# Patient Record
(demographics unavailable — no encounter records)

---

## 2022-07-30 LAB
APPEARANCE UR: ABNORMAL
BACTERIA, URINE: ABNORMAL
BILIRUB UR STRIP.AUTO-MCNC: ABNORMAL MG/DL
COLOR UR: YELLOW
GLUCOSE UR STRIP.AUTO-MCNC: NEGATIVE MG/DL
KETONES UR STRIP.AUTO-MCNC: >=80 MG/DL
LEUKOCYTE ESTERASE UR QL STRIP: ABNORMAL
MUCUS, URINE: ABNORMAL /LPF
NITRITE UR QL STRIP.AUTO: NEGATIVE
PH UR STRIP.AUTO: 6 [PH] (ref 4.5–8)
PROT UR QL STRIP: 100
RBC # UR STRIP: ABNORMAL /UL
RBC URINE: ABNORMAL /HPF (ref 0–2)
SP GR UR STRIP: >=1.03 (ref 1–1.03)
SQUAMOUS EPITHELIAL: ABNORMAL /LPF
UROBILINOGEN UR STRIP-MCNC: 1 EU/DL
WBC URINE: ABNORMAL /HPF (ref 0–2)

## 2022-08-01 LAB
FINAL REPORT: NORMAL
ORGANISM: NORMAL
PRELIMINARY: NORMAL
PRELIMINARY: NORMAL

## 2022-10-17 NOTE — DISCHARGE SUMMARY
ED Clinical Summary                         [facility]  [address], [place], [address] [phone]           PERSON INFORMATION  Name: Ranjana Shankar Age:   7 Years : 2014   Sex: Female Language: English PCP: PCP,  UNKNOWN   Marital Status:  Single Phone: [phone] Med Service: MED-Medicine   MRN:  [id number] Acct# [de-identified] Arrival: 2022 13:10:00   Visit Reason: Medical screening exam; BLOOD IN URINE Acuity: 4 LOS: 000 02:49   Address:      [address] [place] [id number]  Diagnosis:      Urinary tract infection  Printed Prescriptions: Allergies      No Known Medication Allergies      Medications Administered During Visit:                  Medication Dose Route   sulfamethoxazole-trimethoprim 160 mg Oral       Patient Medication List:              sulfamethoxazole-trimethoprim (Bactrim Pediatric 200 mg-40 mg/5 mL oral suspension) 20 Milliliter Oral (given by mouth) 2 times a day for 7 Days. for max dose 160 mg TMP per dose. Refills: 0. Major Tests and Procedures: The following procedures and tests were performed during your ED visit. COMMONPROCEDURES%>  COMMON PROCEDURESCOMMENTS%>          Laboratory Orders  Name Status Details   . UA Micro Completed Urine, Clean Catch, Stat, ST - Stat, Collected, 22 14:42:00 EDT, Once, 22 14:42:00 EDT, Nurse collect, 22 14:42:00 EDT, GERRY Jalloh, Print label Y/N, [id number]   C Urine Ordered Urine, Clean Catch, Stat, ST - Stat, 22 15:41:00 EDT, 22 15:41:00 EDT, Nurse collect   UA Rflx [place] Completed Urine, Clean Catch, Stat, ST - Stat, 22 14:21:00 EDT, Once, 22 14:21:00 EDT, Nurse collect, Ana HERNANDEZ, Print label Y/N               Radiology Orders  No radiology orders were placed.               Patient Care Orders  Name Status Details   Discharge Patient Ordered 22 15:47:00 EDT   ED Assessment Pediatric Completed 07/30/22 13:16:35 EDT, 07/30/22 13:16:35 EDT   ED Secondary Triage Completed 07/30/22 13:16:35 EDT, 07/30/22 13:16:35 EDT   ED Triage Pediatric Completed 07/30/22 13:11:02 EDT, 07/30/22 13:11:02 EDT             PROVIDER INFORMATION               Provider Role Assigned Paula Caldwell ED Nurse 7/30/2022 14:19:59    Ellie HERNANDEZ ED Provider 7/30/2022 14:20:50 7/30/2022 14:22:49   Ferny WOODS ED MidLevel 7/30/2022 14:20:53 7/30/2022 14:20:55   Laurie OCONNELL ED Provider 7/30/2022 14:20:58        Attending Physician:  Laurie OCONNELL     Admit Doc  Laurie OCONNELL     Consulting Doc       VITALS INFORMATION  Vital Sign Triage Latest   Temp Oral ORAL_1%>36.8 degC ORAL%>36.8 degC   Temp Temporal TEMPORAL_1%> TEMPORAL%>   Temp Intravascular INTRAVASCULAR_1%> INTRAVASCULAR%>   Temp Axillary AXILLARY_1%> AXILLARY%>   Temp Rectal RECTAL_1%> RECTAL%>   02 Sat 99 % 98 %   Respiratory Rate RATE_1%>18 br/min RATE%>22 br/min   Peripheral Pulse Rate PULSE RATE_1%> PULSE RATE%>   Apical Heart Rate HEART RATE_1%> HEART RATE%>   Blood Pressure BLOOD PRESSURE_1%>/ BLOOD PRESSURE_1%>78 mmHg BLOOD PRESSURE%>147 mmHg / BLOOD PRESSURE%>95 mmHg                 Immunizations      No Immunizations Documented This Visit          DISCHARGE INFORMATION   Discharge Disposition: H Outpt-Sent Home   Discharge Location:    Home   Discharge Date and Time:    7/30/2022 15:59:20   ED Checkout Date and Time:    7/30/2022 15:59:20     DEPART REASON INCOMPLETE INFORMATION               Depart Action Incomplete Reason   Interactive View/I&O Recently assessed               Problems      Active           No Chronic Problems              Smoking Status      No Smoking Status Documented         PATIENT EDUCATION INFORMATION  Instructions:       Urinary Tract Infection, Pediatric     Follow up:                    With: Address: When:   Follow up with primary care provider  Within 1 to 2 days   Comments: For reevaluation if symptoms not improving; return to ED if any new concerns or changes in symptoms     A urine culture was sent. Please follow-up with your primary doctor to reevaluate final urine culture results. Return to the ER for any worsening symptoms or other concerns           ED PROVIDER DOCUMENTATION     Patient:   Nallely Almonte            MRN: [id number]            FIN: [id number]               Age:   9 years     Sex:  Female     :  2014   Associated Diagnoses:   Urinary tract infection   Author:   Nick OCONNELL      Basic Information   Time seen: Provider Seen (ST)   ED Provider/Time:    Nick OCONNELL / 2022 14:20  . Additional information: Chief Complaint from Nursing Triage Note   Chief Complaint  Chief Complaint: pt ambulates to triage w/mom reports having spotting when she wipes. just started last night. did not see blood in urine or stool. (22 13:12:00). History of Present Illness   Patient presents with mother for evaluation of spotting on the toilet paper the patient wipes. This happened starting last night. She has not any sravanthi blood in the urine or stool. No vaginal bleeding. She has not had any trauma reported. No abdominal pain, nausea, or vomiting. No systemic symptoms. Child is otherwise healthy with immunizations up-to-date. No history of any prior  issues. Severity is mild. Patient herself denies any dysuria or other concerns. .        Review of Systems             Additional review of systems information: All other systems reviewed and otherwise negative. Health Status   Allergies: Allergic Reactions (All)  No Known Medication Allergies. Immunizations: Up to date. Past Medical/ Family/ Social History   Medical history: Reviewed as documented in chart. Surgical history: Reviewed as documented in chart. Family history: Not significant. Social history: Reviewed as documented in chart.    Problem list:    Active Problems (1)  No Chronic Problems   . Physical Examination               Vital Signs   Vital Signs   7/30/2022 14:42 EDT Respiratory Rate 20 br/min (Modified)   5/79/0740 93:67 EDT Systolic Blood Pressure 820 mmHg     Diastolic Blood Pressure 78 mmHg     Temperature Oral 36.8 degC     Heart Rate Monitored 114 bpm     Respiratory Rate 18 br/min     SpO2 99 %    . Measurements   7/30/2022 15:37 EDT Weight Dosing 38.7 kg   7/30/2022 13:12 EDT Weight Measured 38.7 kg   . Basic Oxygen Information   7/30/2022 14:42 EDT Oxygen Therapy Room air   7/30/2022 13:12 EDT Oxygen Therapy Room air    SpO2 99 %   . General:  Alert, no acute distress. Skin:  Warm, dry. Head:  Normocephalic, atraumatic. Eye:  Normal conjunctiva. Respiratory:  Respirations are non-labored. Gastrointestinal:  Soft, Nontender, Non distended. Genitourinary:  External vaginal exam chaperoned by nurse Chery Barajas as well as mother was present. The external vaginal area is normal.  The introitus is normal with intact hymen. There is some mild hyperemia to the vaginal introitus but no obvious trauma no bleeding. Psychiatric:  Cooperative. Medical Decision Making   Documents reviewed:  Emergency department nurses' notes, flowsheet, vital signs. Results review:  Lab results : Lab View   7/30/2022 14:42 EDT UA Color Yellow    UA Appear Cloudy    UA Glucose Negative    UA Bili Small    UA Ketones >=80 mg/dL    UA Spec Grav >=1.030    UA Blood Large    UA pH 6.0    Protein U 100    UA Urobilinogen 1.0 EU/dL    UA Nitrite Negative    UA Leuk Est Large    WBC U  /HPF    RBC U 21-50 /HPF    Sq Epi U Few /LPF    Bacteria U Moderate    Mucus U Few /LPF   .       Reexamination/ Reevaluation   Vital signs   Basic Oxygen Information   7/30/2022 14:42 EDT Oxygen Therapy Room air   7/30/2022 13:12 EDT Oxygen Therapy Room air    SpO2 99 %         Impression and Plan   Diagnosis   Urinary tract infection ([id number] N39.0, Discharge, Medical)   Plan   Condition: Stable. Disposition: Discharged: to home. Prescriptions: Bactrim pediatric 200-40, 20 mL per dose x7 days. Due to the patient's weight this is to the maximum recommended dose of 160 mg of trimethoprim per dose. Patient was given the following educational materials: Urinary Tract Infection, Pediatric. Follow up with: Follow up with primary care provider Within 1 to 2 days For reevaluation if symptoms not improving; return to ED if any new concerns or changes in symptoms    A urine culture was sent. Please follow-up with your primary doctor to reevaluate final urine culture results. Return to the ER for any worsening symptoms or other concerns. Notes: No signs of vaginal trauma per external inspection. The urine looks infected and we will treat for this as the cause of the hematuria. Patient itself is relatively asymptomatic. This sounds like her first UTI in life I did advise mother close pediatrician follow-up is important as well as pending urine culture.

## 2022-10-17 NOTE — ED NOTES
ED Triage Note       ED Secondary Triage Entered On:  7/30/2022 14:42 EDT    Performed On:  7/30/2022 14:41 EDT by Algernon Call C-RN               General Information   Barriers to Learning :   None evident   Immunizations Current :   Yes   ED Home Meds Section :   Document assessment   [facility] ED Fall Risk Section :   Not applicable   ED Advance Directives Section :   Document assessment   ED Palliative Screen :   N/A (prefilled for <66yo)   Algernon Call C-RN - 7/30/2022 14:41 EDT   (As Of: 7/30/2022 14:42:24 EDT)   Problems(Active)    No Chronic Problems (Cerner  :NKP )  Name of Problem:   No Chronic Problems ; Recorder:   Algernon Call C-RN; Code:   NKP ; Last Updated:   7/30/2022 14:42 EDT ; Life Cycle Date:   7/30/2022 ; Life Cycle Status:   Active ; Vocabulary:   Cerner          Diagnoses(Active)    Medical screening exam  Date:   7/30/2022 ; Diagnosis Type:   Reason For Visit ; Confirmation:   Complaint of ; Clinical Dx:    Medical screening exam ; Classification:   Medical ; Clinical Service:   Emergency medicine ; Code:   PNED ; Probability:   0 ; Diagnosis Code:   [id number]             -    Procedure History   (As Of: 7/30/2022 14:42:24 EDT)     ED Advance Directive   Advance Directive :   No   Algernon Call C-RN - 7/30/2022 14:41 EDT   Med Hx   Medication List   (As Of: 7/30/2022 14:42:24 EDT)   No Known Home Medications     Algernon Call C-RN - 7/30/2022 14:42:20

## 2022-10-17 NOTE — ED NOTES
ED Triage Note       ED Triage Pediatric Entered On:  7/30/2022 13:16 EDT    Performed On:  7/30/2022 13:12 EDT by Lucille Kraus               Triage Pediatric 8.6.19   Chief Complaint :   pt ambulates to triage w/mom reports having spotting when she wipes. just started last night. did not see blood in urine or stool. [place] [facility] Mode of Arrival :   Private vehicle   Infectious Disease Documentation :   Document assessment   Minor Treatment Consent :   Obtained   Pregnancy Status :   N/A   Temperature Oral :   36.8 degC(Converted to: 98.2 degF)    Heart Rate Monitored :   114 bpm   Respiratory Rate :   18 br/min   Systolic Blood Pressure :   927 mmHg   Diastolic Blood Pressure :   78 mmHg   SpO2 :   99 %   Oxygen Therapy :   Room air   Numeric Rating Pain Scale :   0 = No pain   ED Peds Weight :   Document assessment   Lucille Kraus - 7/30/2022 13:12 EDT   DCP GENERIC CODE   Tracking Acuity :   4   Tracking Group :   ED [facility]   Lucille Kraus - 7/30/2022 13:12 EDT   ED General Section :   Document assessment   ED Allergies Section :   Document assessment   ED Reason for Visit Section :   Document assessment   ED Quick Assessment :   Patient appears awake, alert, oriented to baseline. Skin warm and dry. Moves all extremities. Respiration even and unlabored. Appears in no apparent distress. Lucille Kraus - 7/30/2022 13:12 EDT   ID Risk Screen Symptoms   Recent Travel History :   No recent travel   Last 90 days COVID-19 ID :   No   Close Contact with COVID-19 ID :   No   Last 14 days COVID-19 ID :   No   Jorge Alberto Schultz-RN - 7/30/2022 13:12 EDT   Allergies   (As Of: 7/30/2022 13:16:35 EDT)   Allergies (Active)   No Known Medication Allergies  Estimated Onset Date:   Unspecified ; Created Heraclio Gusman; Reaction Status:   Active ; Category:   Drug ; Substance:   No Known Medication Allergies ; Type:    Allergy ; Updated By:   Lucille Kraus; Source:   Family ; Reviewed Date:   7/30/2022 13:15 EDT        Consent to Treat a Minor   Minor Treatment Consent Given By :   Mother   Method of Consent for Minor Treatment : In person   Naheed Griffith - 7/30/2022 13:12 EDT   Psycho-Social   Last 3 mo, thoughts killing self/others :   NA - Pediatric pt OR unable to answer   Right click within box for Suspected Abuse policy link. :   None   Feels Safe Where Live :   Yes   ED Behavioral Activity Rating Scale :   4 - Quiet and awake (normal level of activity)   Naheed Griffith - 7/30/2022 13:12 EDT   ED Reason for Visit   (As Of: 7/30/2022 13:16:35 EDT)   Diagnoses(Active)    Medical screening exam  Date:   7/30/2022 ; Diagnosis Type:   Reason For Visit ; Confirmation:   Complaint of ; Clinical Dx:    Medical screening exam ; Classification:   Medical ; Clinical Service:   Emergency medicine ; Code:   PNED ; Probability:   0 ; Diagnosis Code:   [id number]        Peds Weight 8.6.19   Dosing Weight Obtained By :   Measured   ED Out of Range Weight :   Document assessment   Jeny Schultz - 7/30/2022 13:12 EDT   Out of Range Weight 8.6.19   Weight Measured :   38.7 kg(Converted to: 85 lb 5 oz)    Jeny Schultz - 7/30/2022 13:12 EDT

## 2022-10-17 NOTE — ED NOTES
that smells bad or unusual.      Symptoms in older children     Needing to urinate right away (urgently). Pain or burning with urination. Bed-wetting, or getting up at night to urinate. Trouble urinating. Blood in the urine. Fever. Pain in the lower abdomen or back. Vaginal discharge for girls. Constipation. How is this diagnosed? This condition is diagnosed based on your child's medical history and physical exam. Your child may also have other tests, including:   Urine tests. Depending on your child's age and whether he or she is toilet trained, urine may be collected by:  ? Clean catch urine collection. ? Urinary catheterization. Blood tests. Tests for sexually transmitted infections (STIs). This may be done for older children. If your child has had more than one UTI, a cystoscopy or imaging studies may be done to determine the cause of the infections. How is this treated? Treatment for this condition often includes a combination of two or more of the following:   Antibiotic medicine. Other medicines to treat less common causes of UTI. Over-the-counter medicines to treat pain. Drinking enough water to help clear bacteria out of the urinary tract and keep your child well hydrated. If your child cannot do this, fluids may need to be given through an IV. Bowel and bladder training. In rare cases, urinary tract infections can cause sepsis. Sepsis is a life-threatening condition that occurs when the body responds to an infection. Sepsis is treated in the hospital with IV antibiotics, fluids, and other medicines. Follow these instructions at home:       After urinating or having a bowel movement, your child should wipe from front to back. Your child should use each tissue only one time. Medicines     Give over-the-counter and prescription medicines only as told by your child's health care provider.      If your child was prescribed an antibiotic medicine, give it as told by your child's health care provider. Do not stop giving the antibiotic even if your child starts to feel better. General instructions     Encourage your child to:  ? Empty his or her bladder often and to not hold urine for long periods of time. ? Empty his or her bladder completely during urination. ? Sit on the toilet for 10 minutes after each meal to help him or her build the habit of going to the bathroom more regularly. Have your child drink enough fluid to keep his or her urine pale yellow. Keep all follow-up visits as told by your child's health care provider. This is important. Contact a health care provider if your child's symptoms:     Have not improved after you have given antibiotics for 2 days. Go away and then return. Get help right away if your child:     Has a fever. Is younger than 3 months and has a temperature of 100. 4? F (38?C) or higher. Has severe pain in the back or lower abdomen. Is vomiting. Summary     A urinary tract infection (UTI) is an infection of any part of the urinary tract, which includes the kidneys, ureters, bladder, and urethra. Most urinary tract infections are caused by bacteria in your child's genital area, around the entrance to the urinary tract (urethra). Treatment for this condition often includes antibiotic medicines. If your child was prescribed an antibiotic medicine, give it as told by your child's health care provider. Do not stop giving the antibiotic even if your child starts to feel better. Keep all follow-up visits as told by your child's health care provider. This information is not intended to replace advice given to you by your health care provider. Make sure you discuss any questions you have with your health care provider. Document Revised: 06/27/2019 Document Reviewed: 06/27/2019  Elsevier Patient Education ?  [address]

## 2022-10-17 NOTE — ED NOTES
ED Patient Summary       ;          [facility]  [address], [place], [address] [place] [address]  [phone]  Discharge Instructions (Patient)  Sabrina Yoon  :  2014                   MRN: [id number]                   FIN: [id number]  Reason For Visit: Medical screening exam; BLOOD IN URINE  Final Diagnosis: Urinary tract infection     Visit Date: 2022 13:10:00  Address: [place] [address]  Phone: [phone]     Emergency Department Providers:         Primary Physician:      Zenaida Martin      [facility] would like to thank you for allowing us to assist you with your healthcare needs. The following includes patient education materials and information regarding your injury/illness. Follow-up Instructions: You were seen today on an emergency basis. Please contact your primary care doctor for a follow up appointment. If you received a referral to a specialist doctor, it is important you follow-up as instructed. It is important that you call your follow-up doctor to schedule and confirm the location of your next appointment. Your doctor may practice at multiple locations. The office location of your follow-up appointment may be different to the one written on your discharge instructions. If you do not have a primary care doctor, please call [phone] for help in finding a [facility] Provider. For help in finding a specialist doctor, please call ..26.. If your condition gets worse before your follow-up with your primary care doctor or specialist, please return to the Emergency Department. Coronavirus 2019 (COVID-19) Reminders:     Patients age 15 - 24, with parental consent, and over age 25 can make an appointment for a COVID-19 vaccine. Patients can contact their [facility] doctors' offices to schedule an appointment to receive the COVID-19 vaccine.  Patients who do not have a [facility] physician can call [phone] to schedule vaccination appointments. Follow Up Appointments:  Primary Care Provider:     Name: PCP,  UNKNOWN     Phone:                  With: Address: When:   Follow up with primary care provider  Within 1 to 2 days   Comments: For reevaluation if symptoms not improving; return to ED if any new concerns or changes in symptoms     A urine culture was sent. Please follow-up with your primary doctor to reevaluate final urine culture results. Return to the ER for any worsening symptoms or other concerns              [address] SERVICES%>             New Medications  Printed Prescriptions  sulfamethoxazole-trimethoprim (Bactrim Pediatric 200 mg-40 mg/5 mL oral suspension) 20 Milliliter Oral (given by mouth) 2 times a day for 7 Days. for max dose 160 mg TMP per dose. Refills: 0. Last Dose:____________________      Allergy Info: No Known Medication Allergies     Discharge Additional Information          Discharge Patient 07/30/22 15:47:00 EDT      Patient Education Materials:        Urinary Tract Infection, Pediatric      A urinary tract infection (UTI) is an infection of any part of the urinary tract. The urinary tract includes the kidneys, ureters, bladder, and urethra. These organs make, store, and get rid of urine in the body. Your child's health care provider may use other names to describe the infection. An upper UTI affects the ureters and kidneys (pyelonephritis). A lower UTI affects the bladder (cystitis) and urethra (urethritis). What are the causes? Most urinary tract infections are caused by bacteria in the genital area, around the entrance to your child's urinary tract (urethra). These bacteria grow and cause inflammation of your child's urinary tract. What increases the risk? This condition is more likely to develop if:   Your child is a boy and is uncircumcised.      Your child is a girl and is 3years old or younger. Your child is a boy and is 3year old or younger. Your child is an infant and has a condition in which urine from the bladder goes back into the tubes that connect the kidneys to the bladder (vesicoureteral reflux). Your child is an infant and he or she was born prematurely. Your child is constipated. Your child has a urinary catheter that stays in place (indwelling). Your child has a weak disease-fighting system (immunesystem). Your child has a medical condition that affects his or her bowels, kidneys, or bladder. Your child has diabetes. Your older child engages in sexual activity. What are the signs or symptoms? Symptoms of this condition vary depending on the age of the child. Symptoms in younger children     Fever. This may be the only symptom in young children. Refusing to eat. Sleeping more often than usual.     Irritability. Vomiting. Diarrhea. Blood in the urine. Urine that smells bad or unusual.      Symptoms in older children     Needing to urinate right away (urgently). Pain or burning with urination. Bed-wetting, or getting up at night to urinate. Trouble urinating. Blood in the urine. Fever. Pain in the lower abdomen or back. Vaginal discharge for girls. Constipation. How is this diagnosed? This condition is diagnosed based on your child's medical history and physical exam. Your child may also have other tests, including:   Urine tests. Depending on your child's age and whether he or she is toilet trained, urine may be collected by:  ? Clean catch urine collection. ? Urinary catheterization. Blood tests. Tests for sexually transmitted infections (STIs). This may be done for older children. If your child has had more than one UTI, a cystoscopy or imaging studies may be done to determine the cause of the infections. How is this treated? higher. Has severe pain in the back or lower abdomen. Is vomiting. Summary     A urinary tract infection (UTI) is an infection of any part of the urinary tract, which includes the kidneys, ureters, bladder, and urethra. Most urinary tract infections are caused by bacteria in your child's genital area, around the entrance to the urinary tract (urethra). Treatment for this condition often includes antibiotic medicines. If your child was prescribed an antibiotic medicine, give it as told by your child's health care provider. Do not stop giving the antibiotic even if your child starts to feel better. Keep all follow-up visits as told by your child's health care provider. This information is not intended to replace advice given to you by your health care provider. Make sure you discuss any questions you have with your health care provider. Document Revised: 06/27/2019 Document Reviewed: 06/27/2019  Ken Patient Education ? [address] [facility]      ---------------------------------------------------------------------------------------------------------------------  [facility] allows patients to review your COVID and other test results as well as discharge documents from any [facility], Emergency Department, surgical center or outpatient lab. Test results are typically available 36 hours after the test is completed. [address] encourages you to self-enroll in the [facility] Patient Portal.     To begin your self-enrollment process, please visit [email or web address] Under [facility], click on Sign up now. NOTE: You must be 16 years and older to use [facility] Self-Enroll online. If you are a parent, caregiver, or guardian; you need an invite to access your childs or dependents health records.  To obtain an invite, contact the Medical Records department at [phone] Monday through Friday, 8-4:30, select option 3 . If we receive your call afterhours, we will return your call the next business day. If you have issues trying to create or access your account, contact [employer] at [phone] available 7 days a week 24 hours a day.      Comment:

## 2022-10-17 NOTE — ED PROVIDER NOTES
General Medical Problem *ED        Patient:   Glenn Rater            MRN: [id number]            FIN: [id number]               Age:   9 years     Sex:  Female     :  2014   Associated Diagnoses:   Urinary tract infection   Author:   Yair OCONNELL      Basic Information   Time seen: Provider Seen (ST)   ED Provider/Time:    Yair OCONNELL / 2022 14:20  . Additional information: Chief Complaint from Nursing Triage Note   Chief Complaint  Chief Complaint: pt ambulates to triage w/mom reports having spotting when she wipes. just started last night. did not see blood in urine or stool. (22 13:12:00). History of Present Illness   Patient presents with mother for evaluation of spotting on the toilet paper the patient wipes. This happened starting last night. She has not any sravanthi blood in the urine or stool. No vaginal bleeding. She has not had any trauma reported. No abdominal pain, nausea, or vomiting. No systemic symptoms. Child is otherwise healthy with immunizations up-to-date. No history of any prior  issues. Severity is mild. Patient herself denies any dysuria or other concerns. .        Review of Systems             Additional review of systems information: All other systems reviewed and otherwise negative. Health Status   Allergies: Allergic Reactions (All)  No Known Medication Allergies. Immunizations: Up to date. Past Medical/ Family/ Social History   Medical history: Reviewed as documented in chart. Surgical history: Reviewed as documented in chart. Family history: Not significant. Social history: Reviewed as documented in chart. Problem list:    Active Problems (1)  No Chronic Problems   .       Physical Examination               Vital Signs   Vital Signs   2022 14:42 EDT Respiratory Rate 20 br/min (Modified)    48:23 EDT Systolic Blood Pressure 179 mmHg     Diastolic Blood Pressure 78 mmHg     Temperature Oral 36.8 degC Heart Rate Monitored 114 bpm     Respiratory Rate 18 br/min     SpO2 99 %    . Measurements   7/30/2022 15:37 EDT Weight Dosing 38.7 kg   7/30/2022 13:12 EDT Weight Measured 38.7 kg   . Basic Oxygen Information   7/30/2022 14:42 EDT Oxygen Therapy Room air   7/30/2022 13:12 EDT Oxygen Therapy Room air    SpO2 99 %   . General:  Alert, no acute distress. Skin:  Warm, dry. Head:  Normocephalic, atraumatic. Eye:  Normal conjunctiva. Respiratory:  Respirations are non-labored. Gastrointestinal:  Soft, Nontender, Non distended. Genitourinary:  External vaginal exam chaperoned by nurse Claudia Handler as well as mother was present. The external vaginal area is normal.  The introitus is normal with intact hymen. There is some mild hyperemia to the vaginal introitus but no obvious trauma no bleeding. Psychiatric:  Cooperative. Medical Decision Making   Documents reviewed:  Emergency department nurses' notes, flowsheet, vital signs. Results review:  Lab results : Lab View   7/30/2022 14:42 EDT UA Color Yellow    UA Appear Cloudy    UA Glucose Negative    UA Bili Small    UA Ketones >=80 mg/dL    UA Spec Grav >=1.030    UA Blood Large    UA pH 6.0    Protein U 100    UA Urobilinogen 1.0 EU/dL    UA Nitrite Negative    UA Leuk Est Large    WBC U  /HPF    RBC U 21-50 /HPF    Sq Epi U Few /LPF    Bacteria U Moderate    Mucus U Few /LPF   . Reexamination/ Reevaluation   Vital signs   Basic Oxygen Information   7/30/2022 14:42 EDT Oxygen Therapy Room air   7/30/2022 13:12 EDT Oxygen Therapy Room air    SpO2 99 %         Impression and Plan   Diagnosis   Urinary tract infection ([id number] N39.0, Discharge, Medical)   Plan   Condition: Stable. Disposition: Discharged: to home. Prescriptions: Bactrim pediatric 200-40, 20 mL per dose x7 days. Due to the patient's weight this is to the maximum recommended dose of 160 mg of trimethoprim per dose.     Patient was given the following educational materials: Urinary Tract Infection, Pediatric. Follow up with: Follow up with primary care provider Within 1 to 2 days For reevaluation if symptoms not improving; return to ED if any new concerns or changes in symptoms    A urine culture was sent. Please follow-up with your primary doctor to reevaluate final urine culture results. Return to the ER for any worsening symptoms or other concerns. Notes: No signs of vaginal trauma per external inspection. The urine looks infected and we will treat for this as the cause of the hematuria. Patient itself is relatively asymptomatic. This sounds like her first UTI in life I did advise mother close pediatrician follow-up is important as well as pending urine culture.     Signature Line     Electronically Signed on 07/30/2022 03:48 PM EDT   ________________________________________________   Maggie OCONNELL

## 2022-11-05 NOTE — ED NOTES
ED Results Callback Log     Urine Culture  Collected: 07/30/2022 EC  Complete Body site:   Specimen Type: U CleanCatch 08/02/2022 10:15      08/02/2022 10:45 Lenny Foster RN, [place]) No further action required Urine culture came back positive, sensitive to Septra.  Pt prescribed Septra